# Patient Record
Sex: FEMALE | Race: OTHER | HISPANIC OR LATINO | URBAN - METROPOLITAN AREA
[De-identification: names, ages, dates, MRNs, and addresses within clinical notes are randomized per-mention and may not be internally consistent; named-entity substitution may affect disease eponyms.]

---

## 2024-10-03 ENCOUNTER — EMERGENCY (EMERGENCY)
Facility: HOSPITAL | Age: 34
LOS: 1 days | Discharge: HOME | End: 2024-10-03
Attending: EMERGENCY MEDICINE
Payer: COMMERCIAL

## 2024-10-03 VITALS
TEMPERATURE: 99 F | DIASTOLIC BLOOD PRESSURE: 72 MMHG | RESPIRATION RATE: 18 BRPM | SYSTOLIC BLOOD PRESSURE: 111 MMHG | HEART RATE: 69 BPM | WEIGHT: 193.12 LBS | OXYGEN SATURATION: 96 %

## 2024-10-03 LAB
ALBUMIN SERPL ELPH-MCNC: 3.7 G/DL — SIGNIFICANT CHANGE UP (ref 3.5–5)
ALP SERPL-CCNC: 121 U/L — HIGH (ref 40–120)
ALT FLD-CCNC: 46 U/L DA — SIGNIFICANT CHANGE UP (ref 10–60)
ANION GAP SERPL CALC-SCNC: 4 MMOL/L — LOW (ref 5–17)
APPEARANCE UR: ABNORMAL
AST SERPL-CCNC: 23 U/L — SIGNIFICANT CHANGE UP (ref 10–40)
BACTERIA # UR AUTO: ABNORMAL /HPF
BASOPHILS # BLD AUTO: 0.03 K/UL — SIGNIFICANT CHANGE UP (ref 0–0.2)
BASOPHILS NFR BLD AUTO: 0.4 % — SIGNIFICANT CHANGE UP (ref 0–2)
BILIRUB SERPL-MCNC: 0.2 MG/DL — SIGNIFICANT CHANGE UP (ref 0.2–1.2)
BILIRUB UR-MCNC: NEGATIVE — SIGNIFICANT CHANGE UP
BUN SERPL-MCNC: 15 MG/DL — SIGNIFICANT CHANGE UP (ref 7–18)
CALCIUM SERPL-MCNC: 9.2 MG/DL — SIGNIFICANT CHANGE UP (ref 8.4–10.5)
CHLORIDE SERPL-SCNC: 107 MMOL/L — SIGNIFICANT CHANGE UP (ref 96–108)
CO2 SERPL-SCNC: 30 MMOL/L — SIGNIFICANT CHANGE UP (ref 22–31)
COLOR SPEC: YELLOW — SIGNIFICANT CHANGE UP
CREAT SERPL-MCNC: 0.72 MG/DL — SIGNIFICANT CHANGE UP (ref 0.5–1.3)
DIFF PNL FLD: ABNORMAL
EGFR: 113 ML/MIN/1.73M2 — SIGNIFICANT CHANGE UP
EOSINOPHIL # BLD AUTO: 0.4 K/UL — SIGNIFICANT CHANGE UP (ref 0–0.5)
EOSINOPHIL NFR BLD AUTO: 5.5 % — SIGNIFICANT CHANGE UP (ref 0–6)
EPI CELLS # UR: PRESENT
GLUCOSE SERPL-MCNC: 98 MG/DL — SIGNIFICANT CHANGE UP (ref 70–99)
GLUCOSE UR QL: NEGATIVE MG/DL — SIGNIFICANT CHANGE UP
HCG SERPL-ACNC: <1 MIU/ML — SIGNIFICANT CHANGE UP
HCT VFR BLD CALC: 38.2 % — SIGNIFICANT CHANGE UP (ref 34.5–45)
HGB BLD-MCNC: 13 G/DL — SIGNIFICANT CHANGE UP (ref 11.5–15.5)
IMM GRANULOCYTES NFR BLD AUTO: 0.3 % — SIGNIFICANT CHANGE UP (ref 0–0.9)
KETONES UR-MCNC: NEGATIVE MG/DL — SIGNIFICANT CHANGE UP
LACTATE SERPL-SCNC: 0.7 MMOL/L — SIGNIFICANT CHANGE UP (ref 0.7–2)
LEUKOCYTE ESTERASE UR-ACNC: ABNORMAL
LIDOCAIN IGE QN: 29 U/L — SIGNIFICANT CHANGE UP (ref 13–75)
LYMPHOCYTES # BLD AUTO: 1.98 K/UL — SIGNIFICANT CHANGE UP (ref 1–3.3)
LYMPHOCYTES # BLD AUTO: 27.2 % — SIGNIFICANT CHANGE UP (ref 13–44)
MCHC RBC-ENTMCNC: 29.7 PG — SIGNIFICANT CHANGE UP (ref 27–34)
MCHC RBC-ENTMCNC: 34 GM/DL — SIGNIFICANT CHANGE UP (ref 32–36)
MCV RBC AUTO: 87.2 FL — SIGNIFICANT CHANGE UP (ref 80–100)
MONOCYTES # BLD AUTO: 0.63 K/UL — SIGNIFICANT CHANGE UP (ref 0–0.9)
MONOCYTES NFR BLD AUTO: 8.7 % — SIGNIFICANT CHANGE UP (ref 2–14)
NEUTROPHILS # BLD AUTO: 4.21 K/UL — SIGNIFICANT CHANGE UP (ref 1.8–7.4)
NEUTROPHILS NFR BLD AUTO: 57.9 % — SIGNIFICANT CHANGE UP (ref 43–77)
NITRITE UR-MCNC: NEGATIVE — SIGNIFICANT CHANGE UP
NRBC # BLD: 0 /100 WBCS — SIGNIFICANT CHANGE UP (ref 0–0)
PH UR: 6.5 — SIGNIFICANT CHANGE UP (ref 5–8)
PLATELET # BLD AUTO: 230 K/UL — SIGNIFICANT CHANGE UP (ref 150–400)
POTASSIUM SERPL-MCNC: 4.1 MMOL/L — SIGNIFICANT CHANGE UP (ref 3.5–5.3)
POTASSIUM SERPL-SCNC: 4.1 MMOL/L — SIGNIFICANT CHANGE UP (ref 3.5–5.3)
PROT SERPL-MCNC: 7.7 G/DL — SIGNIFICANT CHANGE UP (ref 6–8.3)
PROT UR-MCNC: NEGATIVE MG/DL — SIGNIFICANT CHANGE UP
RBC # BLD: 4.38 M/UL — SIGNIFICANT CHANGE UP (ref 3.8–5.2)
RBC # FLD: 11.9 % — SIGNIFICANT CHANGE UP (ref 10.3–14.5)
RBC CASTS # UR COMP ASSIST: ABNORMAL /HPF
SODIUM SERPL-SCNC: 141 MMOL/L — SIGNIFICANT CHANGE UP (ref 135–145)
SP GR SPEC: 1.02 — SIGNIFICANT CHANGE UP (ref 1–1.03)
UROBILINOGEN FLD QL: 2 MG/DL (ref 0.2–1)
WBC # BLD: 7.27 K/UL — SIGNIFICANT CHANGE UP (ref 3.8–10.5)
WBC # FLD AUTO: 7.27 K/UL — SIGNIFICANT CHANGE UP (ref 3.8–10.5)
WBC UR QL: 2 /HPF — SIGNIFICANT CHANGE UP (ref 0–5)

## 2024-10-03 PROCEDURE — 99284 EMERGENCY DEPT VISIT MOD MDM: CPT

## 2024-10-03 PROCEDURE — 74176 CT ABD & PELVIS W/O CONTRAST: CPT | Mod: 26,MC

## 2024-10-03 RX ORDER — MAG HYDROX/ALUMINUM HYD/SIMETH 200-200-20
30 SUSPENSION, ORAL (FINAL DOSE FORM) ORAL ONCE
Refills: 0 | Status: COMPLETED | OUTPATIENT
Start: 2024-10-03 | End: 2024-10-03

## 2024-10-03 RX ORDER — KETOROLAC TROMETHAMINE 10 MG/1
15 TABLET, FILM COATED ORAL ONCE
Refills: 0 | Status: DISCONTINUED | OUTPATIENT
Start: 2024-10-03 | End: 2024-10-03

## 2024-10-03 RX ORDER — FAMOTIDINE 40 MG
20 TABLET ORAL ONCE
Refills: 0 | Status: COMPLETED | OUTPATIENT
Start: 2024-10-03 | End: 2024-10-03

## 2024-10-03 RX ADMIN — KETOROLAC TROMETHAMINE 15 MILLIGRAM(S): 10 TABLET, FILM COATED ORAL at 22:10

## 2024-10-03 RX ADMIN — Medication 30 MILLILITER(S): at 22:10

## 2024-10-03 RX ADMIN — Medication 20 MILLIGRAM(S): at 22:10

## 2024-10-03 NOTE — ED ADULT NURSE NOTE - OBJECTIVE STATEMENT
Patient presented to the ED complaining of right lower quadrant abdominal pain for 2 days. Patient states she had two episodes of diarrhea.
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
## 2024-10-03 NOTE — ED ADULT TRIAGE NOTE - CHIEF COMPLAINT QUOTE
I have right, lower abdominal pain since yesterday, 2 episodes of diarrhea today.  Sent from urgent care center for evaluation.

## 2024-10-03 NOTE — ED PROVIDER NOTE - PROGRESS NOTE DETAILS
leon: pt just received meds for pain. ua hematuria no infection- normal creatinine and wbc- possibly kidney stone- ct ordered CTAP:   Bilateral nonobstructive nephrolithiasis. No hydronephrosis.  Underdistended urinary bladder, although wall thickening is suspected   with mild perivesicular fat stranding suggesting cystitis. Correlate with   urinalysis.  Ovaries appear enlarged bilaterally, suboptimally evaluated on this   study. Pelvic ultrasound is recommended for further evaluation, as   clinically warranted.  Fecalized nondistended distal small bowel loops, likely representing slow   transit-time.    Patient feels better. Nontender exam.  Will d/c with rx for Naproxen/Flomax. Return precautions provided. CTAP:   Bilateral nonobstructive nephrolithiasis. No hydronephrosis.  Underdistended urinary bladder, although wall thickening is suspected   with mild perivesicular fat stranding suggesting cystitis. Correlate with   urinalysis.  Ovaries appear enlarged bilaterally, suboptimally evaluated on this   study. Pelvic ultrasound is recommended for further evaluation, as   clinically warranted.  Fecalized nondistended distal small bowel loops, likely representing slow   transit-time.    TVUS performed which was normal.  Patient feels better. Nontender exam.  Will d/c with rx for Naproxen/Flomax. Return precautions provided.

## 2024-10-03 NOTE — ED PROVIDER NOTE - PATIENT PORTAL LINK FT
You can access the FollowMyHealth Patient Portal offered by [facility] by registering at the following website: [email or web address]. By joining [facility]’s FollowMyHealth portal, you will also be able to view your health information using other applications (apps) compatible with our system.
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
## 2024-10-03 NOTE — ED PROVIDER NOTE - NSFOLLOWUPINSTRUCTIONS_ED_ALL_ED_FT
You were seen in the emergency department for: pain  Your results report is attached.   From this ED visit you were prescribed:     We checked your urine, blood, electrolytes, chest x-ray and ECG. Your  was normal.  Your  ____ was concerning for xxx.     You may be contacted by the Emergency Department Referrals Coordinator to set up your follow-up appointment within 24-48 hours of your discharge, Monday to Friday. We recommend you follow up with:     Please return to the Emergency Department if you experience any of the following symptoms:   - Shortness of breath or trouble breathing  - Pressure, pain or tightness in the chest  - Face drooping, arm weakness or speech difficulty  - Persistence of severe vomiting  - Head injury or loss of consciousness  - Nonstop bleeding or an open wound    (1) Follow up with your primary care physician within the next 24-48 hours as discussed. In addition, we did not find evidence of a life threatening illness on your testing here today, but listed below are the specialists that will be necessary to see as an outpatient to continue the workup.  Please call the numbers listed below or [phone] to set up the necessary appointments.  (2) Take Tylenol (up to 1000mg or 1 g)  and/or Motrin (up to 600mg) up to every 6 hours as needed for pain.   (3) If you had an IV (intravenous) line placed, it was removed. Sometimes, after IV removal, that area can be tender for a few days; if it develops redness and swelling, those could be signs of infection; in which case, return to the Emergency Department for assessment.  (4) Please continue taking all of your home medications as directed. Lo atendieron en el departamento de emergencias por: dolor  Se adjunta cm informe de resultados. Se notó que tenía cálculos renales.  En esta visita al servicio de urgencias le recetaron: Naproxeno, Flomax.  Le recomendamos realizar un seguimiento con: cm médico de atención primaria.     Regrese al Departamento de Emergencias si experimenta alguno de los siguientes síntomas:   - Dificultad para respirar o dificultad para respirar  - Presión, dolor u opresión en el pecho.  - Sneha caída, debilidad en los brazos o dificultad para hablar  - Persistencia de vómitos intensos.  - Lesión en la davy o pérdida del conocimiento.  - Sangrado continuo o karen herida abierta.    (1) Kalani un seguimiento con cm médico de atención primaria dentro de las próximas 24 a 48 horas, según lo comentado. Además, no encontramos evidencia de karen enfermedad potencialmente mortal en cm prueba aquí hoy, arcelia a continuación se enumeran los especialistas que será necesario consultar de forma ambulatoria para continuar con el estudio.  Llame a los números que figuran a continuación o al [phone] para programar las citas necesarias.  (2) Lithium Tylenol (hasta 1000 mg o 1 g) y/o Motrin (hasta 600 mg) hasta cada 6 horas según sea necesario para el dolor.   (3) Si le colocaron karen vía intravenosa, se la quitaron. A veces, después de la extracción de la vía intravenosa, adiel claudy puede estar sensible asha unos días; si presenta enrojecimiento e hinchazón, podrían ser signos de infección; en cuyo dima, regresar al Departamento de Emergencias para cm evaluación.  (4) Continúe tomando todos purvi medicamentos caseros según las indicaciones.      You were seen in the emergency department for: pain  Your results report is attached. You were noted to have kidney stones.  From this ED visit you were prescribed: Naproxen, Flomax  We recommend you follow up with: your primary care doctor.     Please return to the Emergency Department if you experience any of the following symptoms:   - Shortness of breath or trouble breathing  - Pressure, pain or tightness in the chest  - Face drooping, arm weakness or speech difficulty  - Persistence of severe vomiting  - Head injury or loss of consciousness  - Nonstop bleeding or an open wound    (1) Follow up with your primary care physician within the next 24-48 hours as discussed. In addition, we did not find evidence of a life threatening illness on your testing here today, but listed below are the specialists that will be necessary to see as an outpatient to continue the workup.  Please call the numbers listed below or [phone] to set up the necessary appointments.  (2) Take Tylenol (up to 1000mg or 1 g)  and/or Motrin (up to 600mg) up to every 6 hours as needed for pain.   (3) If you had an IV (intravenous) line placed, it was removed. Sometimes, after IV removal, that area can be tender for a few days; if it develops redness and swelling, those could be signs of infection; in which case, return to the Emergency Department for assessment.  (4) Please continue taking all of your home medications as directed.

## 2024-10-04 VITALS
SYSTOLIC BLOOD PRESSURE: 104 MMHG | TEMPERATURE: 98 F | OXYGEN SATURATION: 99 % | DIASTOLIC BLOOD PRESSURE: 66 MMHG | HEART RATE: 69 BPM | RESPIRATION RATE: 18 BRPM

## 2024-10-04 PROCEDURE — 99284 EMERGENCY DEPT VISIT MOD MDM: CPT | Mod: 25

## 2024-10-04 PROCEDURE — 76856 US EXAM PELVIC COMPLETE: CPT | Mod: 26

## 2024-10-04 PROCEDURE — 96374 THER/PROPH/DIAG INJ IV PUSH: CPT

## 2024-10-04 PROCEDURE — 84702 CHORIONIC GONADOTROPIN TEST: CPT

## 2024-10-04 PROCEDURE — 76830 TRANSVAGINAL US NON-OB: CPT

## 2024-10-04 PROCEDURE — 36415 COLL VENOUS BLD VENIPUNCTURE: CPT

## 2024-10-04 PROCEDURE — 83690 ASSAY OF LIPASE: CPT

## 2024-10-04 PROCEDURE — 83605 ASSAY OF LACTIC ACID: CPT

## 2024-10-04 PROCEDURE — 80053 COMPREHEN METABOLIC PANEL: CPT

## 2024-10-04 PROCEDURE — 85025 COMPLETE CBC W/AUTO DIFF WBC: CPT

## 2024-10-04 PROCEDURE — 76856 US EXAM PELVIC COMPLETE: CPT

## 2024-10-04 PROCEDURE — 76830 TRANSVAGINAL US NON-OB: CPT | Mod: 26

## 2024-10-04 PROCEDURE — 74176 CT ABD & PELVIS W/O CONTRAST: CPT | Mod: MC

## 2024-10-04 PROCEDURE — 96375 TX/PRO/DX INJ NEW DRUG ADDON: CPT

## 2024-10-04 PROCEDURE — 81001 URINALYSIS AUTO W/SCOPE: CPT

## 2024-10-04 RX ORDER — CYCLOBENZAPRINE HCL 10 MG
1 TABLET ORAL
Qty: 15 | Refills: 0
Start: 2024-10-04 | End: 2024-10-08